# Patient Record
Sex: FEMALE | URBAN - METROPOLITAN AREA
[De-identification: names, ages, dates, MRNs, and addresses within clinical notes are randomized per-mention and may not be internally consistent; named-entity substitution may affect disease eponyms.]

---

## 2023-06-07 ENCOUNTER — APPOINTMENT (OUTPATIENT)
Dept: URBAN - METROPOLITAN AREA CLINIC 196 | Age: 28
Setting detail: DERMATOLOGY
End: 2023-06-07

## 2023-06-07 DIAGNOSIS — L20.84 INTRINSIC (ALLERGIC) ECZEMA: ICD-10-CM

## 2023-06-07 PROCEDURE — OTHER ITCH-SCRATCH CYCLE COUNSELING: OTHER

## 2023-06-07 PROCEDURE — OTHER COUNSELING: TOPICAL STEROIDS: OTHER

## 2023-06-07 PROCEDURE — OTHER COUNSELING: OTHER

## 2023-06-07 PROCEDURE — OTHER PRESCRIPTION: OTHER

## 2023-06-07 PROCEDURE — 99204 OFFICE O/P NEW MOD 45 MIN: CPT

## 2023-06-07 RX ORDER — BETAMETHASONE DIPROPIONATE 0.5 MG/G
CREAM, AUGMENTED TOPICAL
Qty: 50 | Refills: 2 | Status: ERX | COMMUNITY
Start: 2023-06-07

## 2023-06-07 ASSESSMENT — ITCH NUMERIC RATING SCALE: HOW SEVERE IS YOUR ITCHING?: 0

## 2023-06-07 ASSESSMENT — LOCATION DETAILED DESCRIPTION DERM
LOCATION DETAILED: LEFT RADIAL PALM
LOCATION DETAILED: LEFT RADIAL DORSAL HAND
LOCATION DETAILED: RIGHT RADIAL DORSAL HAND
LOCATION DETAILED: RIGHT RADIAL PALM

## 2023-06-07 ASSESSMENT — LOCATION ZONE DERM: LOCATION ZONE: HAND

## 2023-06-07 ASSESSMENT — LOCATION SIMPLE DESCRIPTION DERM
LOCATION SIMPLE: RIGHT HAND
LOCATION SIMPLE: LEFT HAND

## 2023-06-07 NOTE — PROCEDURE: COUNSELING
Patient Specific Counseling (Will Not Stick From Patient To Patient): CHRONIC NOT AT TREATMENT GOAL\\nPREVIOUSLY TRIED TAC OINTMENT FROM ANOTHER PROVIDER BUT SAID IT'S TOO GREASY.\\n\\nSTART RX augmented betamethasone cream. Apply BID PRN M-F.\\n\\nSample given of moisturizer
Detail Level: Detailed